# Patient Record
Sex: MALE | Race: WHITE | NOT HISPANIC OR LATINO | Employment: STUDENT | URBAN - METROPOLITAN AREA
[De-identification: names, ages, dates, MRNs, and addresses within clinical notes are randomized per-mention and may not be internally consistent; named-entity substitution may affect disease eponyms.]

---

## 2017-06-06 ENCOUNTER — ALLSCRIPTS OFFICE VISIT (OUTPATIENT)
Dept: OTHER | Facility: OTHER | Age: 37
End: 2017-06-06

## 2017-06-09 ENCOUNTER — ALLSCRIPTS OFFICE VISIT (OUTPATIENT)
Dept: OTHER | Facility: OTHER | Age: 37
End: 2017-06-09

## 2017-11-22 ENCOUNTER — ALLSCRIPTS OFFICE VISIT (OUTPATIENT)
Dept: OTHER | Facility: OTHER | Age: 37
End: 2017-11-22

## 2017-12-20 NOTE — PROGRESS NOTES
Assessment  1  Acute upper respiratory infection (465 9) (J06 9)    Discussion/Summary    ENCORAGED FLUIDS LIGHT FOODS RECHECK IF PERSISTANT SYMPTOMS  Chief Complaint  1  Cold Symptoms  PT COMPLAINS OF NAUSEA, CHILLS AND SWEATS X5D      History of Present Illness  HPI: SWEATS, CHILLS 4 DAYS WORSE PAST 2 DAYS NO MEDS DID GET FLU SHOT 6 DAYS AGO      Review of Systems   Constitutional: feeling poorly,-- chills-- and-- feeling tired  ENT: no complaints of earache, no loss of hearing, no nosebleeds or nasal discharge, no sore throat or hoarseness  Cardiovascular: no complaints of slow or fast heart rate, no chest pain, no palpitations, no leg claudication or lower extremity edema  Respiratory: no complaints of shortness of breath, no wheezing or cough, no dyspnea on exertion, no orthopnea or PND  Gastrointestinal: no complaints of abdominal pain, no constipation, no nausea or vomiting, no diarrhea or bloody stools  Genitourinary: no complaints of dysuria or incontinence, no hesitancy, no nocturia, no genital lesion, no inadequacy of penile erection  Integumentary: no complaints of skin rash or lesion, no itching or dry skin, no skin wounds  Neurological: headache  Active Problems  1  Acute bacterial conjunctivitis of right eye (372 03) (H10 31)   2  Acute conjunctivitis (372 00) (H10 30)   3  Acute otitis media, bilateral (382 9) (H66 93)   4  Acute pharyngitis (462) (J02 9)   5  Acute upper respiratory infection (465 9) (J06 9)   6  Chronic low back pain (724 2,338 29) (M54 5,G89 29)   7  Contact dermatitis (692 9) (L25 9)   8  Herniated lumbar intervertebral disc (722 10) (M51 26)   9  Herpes simplex type 1 infection (054 9) (B00 9)   10  Lumbago (724 2) (M54 5)   11  Lumbar radiculopathy (724 4) (M54 16)   12  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) (M53 3)   13  Sciatica (724 3) (M54 30)    Past Medical History  1  History of Anxiety and depression (300 00,311) (F41 8)   2   History of Environmental allergies (V15 09) (Z91 09)   3  History of acute sinusitis (V12 69) (Z87 09)   4  History of arthritis (V13 4) (Z87 39)   5  History of cocaine abuse (305 63) (Z87 898)   6  History of drug abuse (305 93) (Z87 898)   7  History of Methamphetamine use (305 70) (F15 10)  Active Problems And Past Medical History Reviewed: The active problems and past medical history were reviewed and updated today  Family History  Unknown    1  Family history of malignant neoplasm (V16 9) (Z80 9)  Family History    2  Denied: Family history of alcoholism    Social History   · Always uses seat belt   · Caffeine use (V49 89) (F15 90)   · Cocaine   · Current Every Day Smoker (305 1)   · Current smoker (305 1) (F17 200)   ·    · Does not exercise (V69 0) (Z72 3)   · Drug use (305 90) (F19 90)   · Has 3 children   · Lives in private house   · Lives with children   · Methamphetamines   · Primary language is English   · Supportive and safe   · Unemployed (V62 0) (Z56 0)  The social history was reviewed and updated today  The social history was reviewed and is unchanged  Surgical History  1  Denied: History of Reported Prior Surgical / Procedural History    Current Meds   1  Ciprofloxacin HCl - 0 3 % Ophthalmic Solution; INSTILL 1 DROP Every 4 hours in affected eye for 5 days; Therapy: [id number] to (Evaluate:19Jun2017)  Requested for: [id number]; Last Rx:09Jun2017 Ordered   2  Gabapentin 600 MG Oral Tablet; Therapy: (Recorded:22Nov2017) to Recorded    The medication list was reviewed and updated today  Allergies  1  Amoxil   2  CeleBREX CAPS   3  Penicillins    Vitals   Recorded: 22Nov2017 11:54AM   Temperature 96 8 F   Heart Rate 620   Systolic 443   Diastolic 90   Height 6 ft    Weight 160 lb    BMI Calculated 21 7   BSA Calculated 1 94     Physical Exam   Constitutional  General appearance: No acute distress, well appearing and well nourished     Ears, Nose, Mouth, and Throat  Otoscopic examination: Tympanic membrance translucent with normal light reflex  Canals patent without erythema  Oropharynx: Normal with no erythema, edema, exudate or lesions  Pulmonary  Auscultation of lungs: Clear to auscultation, equal breath sounds bilaterally, no wheezes, no rales, no rhonci  Cardiovascular  Auscultation of heart: Normal rate and rhythm, normal S1 and S2, without murmurs  Examination of extremities for edema and/or varicosities: Normal    Abdomen  Abdomen: Non-tender, no masses  Lymphatic  Palpation of lymph nodes in neck: No lymphadenopathy  Skin  Skin and subcutaneous tissue: Normal without rashes or lesions           Signatures   Electronically signed by : Luci Jeter DO; Dec 19 2017  8:53PM EST                       (Acknowledgement)

## 2018-01-12 VITALS
HEIGHT: 72 IN | SYSTOLIC BLOOD PRESSURE: 130 MMHG | WEIGHT: 160 LBS | HEART RATE: 116 BPM | DIASTOLIC BLOOD PRESSURE: 90 MMHG | TEMPERATURE: 96.8 F | BODY MASS INDEX: 21.67 KG/M2

## 2018-01-13 VITALS
TEMPERATURE: 97.9 F | BODY MASS INDEX: 21.42 KG/M2 | HEIGHT: 72 IN | OXYGEN SATURATION: 98 % | HEART RATE: 118 BPM | DIASTOLIC BLOOD PRESSURE: 80 MMHG | WEIGHT: 158.13 LBS | SYSTOLIC BLOOD PRESSURE: 112 MMHG | RESPIRATION RATE: 18 BRPM

## 2018-01-13 VITALS
SYSTOLIC BLOOD PRESSURE: 120 MMHG | BODY MASS INDEX: 21.43 KG/M2 | HEIGHT: 72 IN | RESPIRATION RATE: 18 BRPM | HEART RATE: 110 BPM | WEIGHT: 158.25 LBS | DIASTOLIC BLOOD PRESSURE: 70 MMHG

## 2018-01-23 NOTE — MISCELLANEOUS
Message  Return to work or school:   Naveen Still is under my professional care   He was seen in my office on 11/22/2017   He is able to return to work on  11/22/2017            Signatures   Electronically signed by : Mike Gillette DO; Dec  5 2017  1:30PM EST                       (Author)

## 2018-05-15 ENCOUNTER — OFFICE VISIT (OUTPATIENT)
Dept: FAMILY MEDICINE CLINIC | Facility: CLINIC | Age: 38
End: 2018-05-15
Payer: COMMERCIAL

## 2018-05-15 ENCOUNTER — TELEPHONE (OUTPATIENT)
Dept: FAMILY MEDICINE CLINIC | Facility: CLINIC | Age: 38
End: 2018-05-15

## 2018-05-15 VITALS
DIASTOLIC BLOOD PRESSURE: 82 MMHG | TEMPERATURE: 98.6 F | RESPIRATION RATE: 18 BRPM | BODY MASS INDEX: 21.62 KG/M2 | WEIGHT: 163.13 LBS | HEART RATE: 96 BPM | HEIGHT: 73 IN | SYSTOLIC BLOOD PRESSURE: 118 MMHG

## 2018-05-15 DIAGNOSIS — J18.9 WALKING PNEUMONIA: Primary | ICD-10-CM

## 2018-05-15 DIAGNOSIS — J31.0 RHINITIS, UNSPECIFIED TYPE: ICD-10-CM

## 2018-05-15 PROCEDURE — 3725F SCREEN DEPRESSION PERFORMED: CPT | Performed by: NURSE PRACTITIONER

## 2018-05-15 PROCEDURE — 99213 OFFICE O/P EST LOW 20 MIN: CPT | Performed by: NURSE PRACTITIONER

## 2018-05-15 PROCEDURE — 3008F BODY MASS INDEX DOCD: CPT | Performed by: NURSE PRACTITIONER

## 2018-05-15 RX ORDER — DULOXETIN HYDROCHLORIDE 30 MG/1
1 CAPSULE, DELAYED RELEASE ORAL DAILY
COMMUNITY
Start: 2017-11-08

## 2018-05-15 RX ORDER — CLARITHROMYCIN 500 MG/1
500 TABLET, COATED ORAL EVERY 12 HOURS SCHEDULED
Qty: 14 TABLET | Refills: 0 | Status: SHIPPED | OUTPATIENT
Start: 2018-05-15 | End: 2018-05-22

## 2018-05-15 RX ORDER — GABAPENTIN 600 MG/1
TABLET ORAL
COMMUNITY

## 2018-05-15 RX ORDER — LEVOFLOXACIN 500 MG/1
500 TABLET, FILM COATED ORAL EVERY 24 HOURS
Qty: 7 TABLET | Refills: 0 | Status: SHIPPED | OUTPATIENT
Start: 2018-05-15 | End: 2018-05-15 | Stop reason: ALTCHOICE

## 2018-05-15 NOTE — LETTER
May 15, 2018     Patient: Ifrah Leija   YOB: 1980   Date of Visit: 5/15/2018       To Whom it May Concern:    Ifrah Leija is under my professional care  He was seen in my office on 5/15/2018  He may return to work on 5/17/2018  If you have any questions or concerns, please don't hesitate to call           Sincerely,          ALONSO Zhao        CC: No Recipients

## 2018-05-15 NOTE — TELEPHONE ENCOUNTER
Pt pharmacy called in and asked if you can send In a different rx since  levofloxacin  500 mg tablet is not covered by pt insurance please advise

## 2018-05-15 NOTE — PROGRESS NOTES
Assessment/Plan:       Diagnoses and all orders for this visit:    Walking pneumonia  -     levofloxacin (LEVAQUIN) 500 mg tablet; Take 1 tablet (500 mg total) by mouth every 24 hours for 7 days    Rhinitis, unspecified type  Comments:  continue with claritin daily  Delsym for cough           Subjective:      Patient ID: Laurie Powers is a 45 y o  male  Cough   This is a new problem  The current episode started in the past 7 days (2 days)  The problem has been gradually worsening  The problem occurs constantly  The cough is productive of sputum  Associated symptoms include chest pain, ear pain, headaches, nasal congestion, postnasal drip, rhinorrhea, a sore throat, sweats and wheezing  Pertinent negatives include no chills, fever or shortness of breath  Nothing aggravates the symptoms  He has tried OTC cough suppressant (antihistamine ) for the symptoms  The treatment provided no relief  The following portions of the patient's history were reviewed and updated as appropriate: allergies, current medications, past family history, past medical history, past social history, past surgical history and problem list     Review of Systems   Constitutional: Negative for chills and fever  HENT: Positive for ear pain, postnasal drip, rhinorrhea and sore throat  Respiratory: Positive for cough and wheezing  Negative for shortness of breath  Cardiovascular: Positive for chest pain  Gastrointestinal: Negative for abdominal pain, diarrhea, nausea and vomiting  Genitourinary: Negative  Musculoskeletal: Negative  Neurological: Positive for headaches  Objective:      /82 (BP Location: Right arm, Patient Position: Sitting, Cuff Size: Standard)   Pulse 96   Temp 98 6 °F (37 °C) (Temporal)   Resp 18   Ht 6' 1" (1 854 m)   Wt 74 kg (163 lb 2 oz)   BMI 21 52 kg/m²          Physical Exam   Constitutional: He appears well-developed  He has a sickly appearance  He appears ill     HENT:   Head: Normocephalic and atraumatic  Right Ear: Tympanic membrane normal    Left Ear: Tympanic membrane normal    Nose: Rhinorrhea present  Right sinus exhibits maxillary sinus tenderness  Left sinus exhibits maxillary sinus tenderness  Mouth/Throat: Posterior oropharyngeal erythema present  Lots of PND   Cardiovascular: Normal rate, regular rhythm, S1 normal and S2 normal     Pulmonary/Chest: No respiratory distress  He has rhonchi in the right middle field and the left lower field  persistent hacking cough noted  Lymphadenopathy:     He has cervical adenopathy  Left cervical: Superficial cervical adenopathy present

## 2018-06-18 ENCOUNTER — OFFICE VISIT (OUTPATIENT)
Dept: URGENT CARE | Facility: MEDICAL CENTER | Age: 38
End: 2018-06-18
Payer: COMMERCIAL

## 2018-06-18 VITALS
HEART RATE: 108 BPM | SYSTOLIC BLOOD PRESSURE: 102 MMHG | DIASTOLIC BLOOD PRESSURE: 54 MMHG | BODY MASS INDEX: 22.53 KG/M2 | HEIGHT: 73 IN | WEIGHT: 170 LBS | OXYGEN SATURATION: 99 % | TEMPERATURE: 97.9 F | RESPIRATION RATE: 18 BRPM

## 2018-06-18 DIAGNOSIS — K52.9 NONINFECTIOUS GASTROENTERITIS, UNSPECIFIED TYPE: Primary | ICD-10-CM

## 2018-06-18 PROCEDURE — G0382 LEV 3 HOSP TYPE B ED VISIT: HCPCS | Performed by: NURSE PRACTITIONER

## 2018-06-18 PROCEDURE — 99283 EMERGENCY DEPT VISIT LOW MDM: CPT | Performed by: NURSE PRACTITIONER

## 2018-06-18 NOTE — PATIENT INSTRUCTIONS
May alternate Tylenol and Ibuprofen as needed for discomfort  Encourage fluids and rest    Maintain BRAT diet  Advance diet as tolerated  Gatorade or Pedialyte as supplementation  Avoid dairy products until symptoms resolve  F/U with PCP if symptoms persist/worsen or go to nearest emergency department if any signs of dehydration  Dehydration   WHAT YOU NEED TO KNOW:   Dehydration is a condition that develops when your body does not have enough fluid  You may become dehydrated if you do not drink enough water or lose too much fluid  Fluid loss may also cause loss of electrolytes (minerals), such as sodium  DISCHARGE INSTRUCTIONS:   Return to the emergency department if:   · You have a seizure  · You are confused or cannot think clearly  · You are extremely sleepy, or another person cannot wake you  · You become dizzy or faint when you stand  · You are not able to urinate  · You have trouble breathing  · You have a fast or irregular heartbeat  · Your hands or feet are cold, or your face is pale  Contact your healthcare provider if:   · You have trouble drinking liquids because you are vomiting  · Your symptoms get worse  · You have a fever  · You feel very weak or tired  · You have questions or concerns about your condition or care  Follow up with your healthcare provider as directed:  Write down your questions so you remember to ask them during your visits  Prevent or manage dehydration:   · Drink liquids as directed  Liquids that contain water, sugar, and minerals can help your body hold in fluid and help prevent dehydration  Drink liquids throughout the day, not just when you feel thirsty  Men should drink about 3 liters (13 eight-ounce cups) of liquid each day  Women should drink about 2 liters (9 eight-ounce cups) of liquid each day  Drink even more liquid if you will be outdoors, in the sun for a long time, or exercising  · Stay cool    Limit the time you spend outdoors during the hottest part of the day  Dress in lightweight clothes  · Keep track of how often you urinate  If you urinate less than usual or your urine is darker, drink more liquids  © 2017 [address] Information is for End User's use only and may not be sold, redistributed or otherwise used for commercial purposes  All illustrations and images included in CareNotes® are the copyrighted property of A D [employer] M [employer]  or [address]  The above information is an  only  It is not intended as medical advice for individual conditions or treatments  Talk to your doctor, nurse or pharmacist before following any medical regimen to see if it is safe and effective for you

## 2018-06-18 NOTE — LETTER
June 18, 2018     Patient: Rohit Miller   YOB: 1980   Date of Visit: 6/18/2018       To Whom it May Concern:    Rohit Miller was seen in my clinic on 6/18/2018  He may return to work on Tuesday, June 19, 2018  If you have any questions or concerns, please don't hesitate to call           Sincerely,          ALONSO Kaba        CC: No Recipients

## 2018-06-18 NOTE — PROGRESS NOTES
[facility]        NAME: Chantale Alcocer is a 45 y o  male  : 1980    MRN: [id number]  DATE: 2018  TIME: 2:08 PM    Assessment and Plan   Noninfectious gastroenteritis, unspecified type [K52 9]  1  Noninfectious gastroenteritis, unspecified type           Patient Instructions     May alternate Tylenol and Ibuprofen as needed for discomfort  Encourage fluids and rest    Maintain BRAT diet  Advance diet as tolerated  Gatorade or Pedialyte as supplementation  Avoid dairy products until symptoms resolve  Follow up with PCP in 3-5 days  Proceed to  ER if symptoms worsen  Chief Complaint     Chief Complaint   Patient presents with    Abdominal Pain     began Friday; wa a 9 last pm; a "2" today    Diarrhea     stopped today         History of Present Illness       Patient presents with abdominal cramping, and legs cramping with diarrhea since last evening  Continues with intermittent cramping of lower legs and in abdomen  Last episode of diarrhea was around 0200  No blood in stool  Denies, fever, N/V  Took a dose of tums which did not help  No recent travel  No recent antibiotic use  Review of Systems   Review of Systems   Constitutional: Negative for chills and fever  Respiratory: Negative  Cardiovascular: Negative  Gastrointestinal: Positive for abdominal pain and diarrhea  Negative for blood in stool, nausea and vomiting  Musculoskeletal: Negative for myalgias  Skin: Negative for rash           Current Medications       Current Outpatient Prescriptions:     clonazePAM (KlonoPIN) 2 mg tablet, Take 2 mg by mouth 3 (three) times a day as needed for seizures, Disp: , Rfl:     DULoxetine (CYMBALTA) 30 mg delayed release capsule, Take 1 capsule by mouth daily, Disp: , Rfl:     gabapentin (NEURONTIN) 600 MG tablet, Take by mouth, Disp: , Rfl:     naproxen (NAPROSYN) 500 mg tablet, Take 1 tablet by mouth 2 (two) times a day with meals for 10 days, Disp: 20 tablet, Rfl: 0    Current Allergies     Allergies as of 06/18/2018 - Reviewed 06/18/2018   Allergen Reaction Noted    Celebrex [celecoxib] GI Intolerance 01/12/2016    Citalopram      Amoxicillin Rash 01/12/2016            The following portions of the patient's history were reviewed and updated as appropriate: allergies, current medications, past family history, past medical history, past social history, past surgical history and problem list      No past medical history on file  No past surgical history on file  Family History   Problem Relation Age of Onset    No Known Problems Mother     No Known Problems Father          Medications have been verified  Objective   /54   Pulse (!) 108   Temp 97 9 °F (36 6 °C)   Resp 18   Ht 6' 1" (1 854 m)   Wt 77 1 kg (170 lb)   SpO2 99%   BMI 22 43 kg/m²        Physical Exam     Physical Exam   Constitutional: He is oriented to person, place, and time  Vital signs are normal  He appears well-developed and well-nourished  He is cooperative  Non-toxic appearance  He does not have a sickly appearance  He does not appear ill  No distress  HENT:   Head: Normocephalic and atraumatic  Right Ear: Hearing, tympanic membrane, external ear and ear canal normal    Left Ear: Hearing, tympanic membrane, external ear and ear canal normal    Nose: Nose normal  No mucosal edema, rhinorrhea or sinus tenderness  Right sinus exhibits no maxillary sinus tenderness and no frontal sinus tenderness  Left sinus exhibits no maxillary sinus tenderness and no frontal sinus tenderness  Mouth/Throat: Uvula is midline, oropharynx is clear and moist and mucous membranes are normal  Normal dentition  No oropharyngeal exudate  Eyes: Conjunctivae, EOM and lids are normal  Pupils are equal, round, and reactive to light  Right eye exhibits no discharge  Left eye exhibits no discharge  Neck: Trachea normal and normal range of motion  No thyroid mass and no thyromegaly present  Cardiovascular: Normal rate, regular rhythm, S1 normal, S2 normal, normal heart sounds, intact distal pulses and normal pulses  Exam reveals no gallop and no friction rub  No murmur heard  Pulmonary/Chest: Effort normal and breath sounds normal  No respiratory distress  He has no wheezes  He has no rhonchi  He has no rales  He exhibits no tenderness  Abdominal: Normal appearance  Bowel sounds are increased  There is generalized tenderness  There is no rigidity, no rebound, no guarding, no CVA tenderness, no tenderness at McBurney's point and negative Crow's sign  Musculoskeletal: Normal range of motion  He exhibits no edema  Lymphadenopathy:     He has no cervical adenopathy  Neurological: He is alert and oriented to person, place, and time  Skin: Skin is warm and dry  No rash noted  He is not diaphoretic  Psychiatric: He has a normal mood and affect  His behavior is normal  Thought content normal    Nursing note and vitals reviewed

## 2018-09-06 ENCOUNTER — OFFICE VISIT (OUTPATIENT)
Dept: URGENT CARE | Facility: MEDICAL CENTER | Age: 38
End: 2018-09-06
Payer: COMMERCIAL

## 2018-09-06 VITALS
HEART RATE: 104 BPM | TEMPERATURE: 98 F | SYSTOLIC BLOOD PRESSURE: 130 MMHG | OXYGEN SATURATION: 96 % | HEIGHT: 73 IN | WEIGHT: 148 LBS | DIASTOLIC BLOOD PRESSURE: 68 MMHG | RESPIRATION RATE: 18 BRPM | BODY MASS INDEX: 19.61 KG/M2

## 2018-09-06 DIAGNOSIS — L01.00 IMPETIGO: Primary | ICD-10-CM

## 2018-09-06 PROCEDURE — 99283 EMERGENCY DEPT VISIT LOW MDM: CPT | Performed by: FAMILY MEDICINE

## 2018-09-06 PROCEDURE — G0382 LEV 3 HOSP TYPE B ED VISIT: HCPCS | Performed by: FAMILY MEDICINE

## 2018-09-06 RX ORDER — MUPIROCIN CALCIUM 20 MG/G
CREAM TOPICAL 3 TIMES DAILY
Qty: 15 G | Refills: 0 | Status: SHIPPED | OUTPATIENT
Start: 2018-09-06

## 2018-09-06 NOTE — PROGRESS NOTES
[facility]        NAME: Marta Gan is a 45 y o  male  : 1980    MRN: [id number]  DATE: 2018  TIME: 5:50 PM    Assessment and Plan   Impetigo [L01 00]  1  Impetigo  mupirocin (BACTROBAN) 2 % cream         Patient Instructions       Follow up with PCP in 3-5 days  Proceed to  ER if symptoms worsen  Chief Complaint     Chief Complaint   Patient presents with    Blister     Patient relates started with blisters to his face x1 month  He missed work yesterday and today needs a note for work  Denies fever  History of Present Illness       Patient describes having blisters on his face on and off for the past month  The seem to recur  In the past week however, sore 7 tender specially around the corner of his left upper lip  At times it drains  He has been applying Neosporin ointment with no significant improvement  He has also developed some sores around his nostrils approximately same duration  He denies fever or chills  Rash is localized to face only  No previous history in the past   At times sores around the nose seem to get crusty  Expresses concern for MRSA  He also needs a work excuse  He has recently noticed at times he gets some nasal congestion and postnasal drip but denies previous history of seasonal allergies  Review of Systems   Review of Systems   Constitutional: Negative  HENT: Positive for congestion  Eyes: Negative  Respiratory: Negative  Neurological: Negative            Current Medications       Current Outpatient Prescriptions:     clonazePAM (KlonoPIN) 2 mg tablet, Take 2 mg by mouth 3 (three) times a day as needed for seizures, Disp: , Rfl:     DULoxetine (CYMBALTA) 30 mg delayed release capsule, Take 1 capsule by mouth daily, Disp: , Rfl:     gabapentin (NEURONTIN) 600 MG tablet, Take by mouth, Disp: , Rfl:     mupirocin (BACTROBAN) 2 % cream, Apply topically 3 (three) times a day, Disp: 15 g, Rfl: 0    Current Allergies Allergies as of 09/06/2018 - Reviewed 09/06/2018   Allergen Reaction Noted    Celebrex [celecoxib] GI Intolerance 01/12/2016    Amoxicillin Rash 01/12/2016            The following portions of the patient's history were reviewed and updated as appropriate: allergies, current medications, past family history, past medical history, past social history, past surgical history and problem list      History reviewed  No pertinent past medical history  History reviewed  No pertinent surgical history  Family History   Problem Relation Age of Onset    No Known Problems Mother     No Known Problems Father          Medications have been verified  Objective   /68   Pulse 104   Temp 98 °F (36 7 °C) (Tympanic)   Resp 18   Ht 6' 1" (1 854 m)   Wt 67 1 kg (148 lb)   SpO2 96%   BMI 19 53 kg/m²        Physical Exam     Physical Exam   Constitutional: He appears well-developed and well-nourished  HENT:   Right and left nares reveals erythematous, scab switch seem hypertension eyes  Neck: Normal range of motion  Neck supple  Pulmonary/Chest: Effort normal and breath sounds normal    Skin:   Multiple, erythematous scabs/excoriation measuring between 2-3 mm  One area of scar a shin located in the corner of his left upper lip which is tender to touch  There is no evidence of induration  Nursing note and vitals reviewed

## 2018-09-06 NOTE — PATIENT INSTRUCTIONS
I prescribed mupirocin cream 2% to be applied t i d  to affected source around the nostril and facial lesions  If there is no improvement 1 week, recommended patient follow-up per primary care provider  Source of face could be related to MRSA  Impetigo   WHAT YOU NEED TO KNOW:   Impetigo is a skin infection caused by bacteria  The infection can cause sores to form anywhere on your body  The sores develop watery or pus-filled blisters that break and form thick crusts  Impetigo is most common in children and spreads easily from person to person  DISCHARGE INSTRUCTIONS:   Return to the emergency department if:   · You have painful, red, warm skin around the blisters  · Your face is swollen  · You urinate less than usual or there is blood in your urine  Contact your healthcare provider if:   · You have a fever  · The sores become more red, swollen, warm, or tender  · The sores do not start to heal after 3 days of treatment  · You have questions or concerns about your condition or care  Medicines:   · Antibiotics  treat the bacterial infection  Antibiotics may be given as a pill or cream  Wash your skin and gently remove any crusts before you apply the antibiotic cream      · Take your medicine as directed  Contact your healthcare provider if you think your medicine is not helping or if you have side effects  Tell him or her if you are allergic to any medicine  Keep a list of the medicines, vitamins, and herbs you take  Include the amounts, and when and why you take them  Bring the list or the pill bottles to follow-up visits  Carry your medicine list with you in case of an emergency  Prevent the spread of impetigo:   · Avoid direct contact  You can spread impetigo if someone touches or uses something that touched your infected skin  You can also spread impetigo on your own body when you touch the area and then touch somewhere else   Keep the sores covered with gauze so you will not scratch or touch them  Keep your fingernails short  Your child may need to wear mittens so he does not scratch his sores  · Wash your hands often  Always wash your hands after you touch the infected area  Wash your hands before you touch food, your eyes, or other people  If no water is available, use an alcohol-based gel to clean your hands  · Wash household items  Do not share or reuse items that have come in contact with impetigo sores  Examples include bedding, towels, washcloths, and eating utensils  These items may be used again after they have been washed with hot water and soap  Clean your sores safely:  Wash your skin sores with antibacterial soap and water  You may need to do this 2 to 3 times each day until the sores heal  If the area is crusted, gently wash the sores with gauze or a clean washcloth to remove the crust  Pat the area dry with a clean towel  Wash your hands, the washcloth, and the towel after you clean the area around the sores  Return to work or school: You may return to work or school 48 hours after you start the antibiotic medicine  If your child has impetigo, tell his school or  center about the infection  Follow up with your healthcare provider as directed:  Write down your questions so you remember to ask them during your visits  © 2017 [address] [employer] Information is for End User's use only and may not be sold, redistributed or otherwise used for commercial purposes  All illustrations and images included in CareNotes® are the copyrighted property of [employer] M [employer] Inc  or [employer]  The above information is an  only  It is not intended as medical advice for individual conditions or treatments  Talk to your doctor, nurse or pharmacist before following any medical regimen to see if it is safe and effective for you

## 2018-09-06 NOTE — LETTER
September 6, 2018     Patient: Richard Prieto   YOB: 1980   Date of Visit: 9/6/2018       To Whom It May Concern: It is my medical opinion that Richard Prieto may return to work on 9/7/2018  If you have any questions or concerns, please don't hesitate to call           Sincerely,        Samuel Greenberg MD    CC: No Recipients

## 2019-06-24 ENCOUNTER — OFFICE VISIT (OUTPATIENT)
Dept: URGENT CARE | Facility: MEDICAL CENTER | Age: 39
End: 2019-06-24
Payer: COMMERCIAL

## 2019-06-24 VITALS
DIASTOLIC BLOOD PRESSURE: 76 MMHG | BODY MASS INDEX: 21.34 KG/M2 | HEART RATE: 94 BPM | HEIGHT: 73 IN | SYSTOLIC BLOOD PRESSURE: 132 MMHG | WEIGHT: 161 LBS | OXYGEN SATURATION: 99 % | TEMPERATURE: 97 F | RESPIRATION RATE: 16 BRPM

## 2019-06-24 DIAGNOSIS — J01.10 ACUTE FRONTAL SINUSITIS, RECURRENCE NOT SPECIFIED: Primary | ICD-10-CM

## 2019-06-24 PROCEDURE — 99213 OFFICE O/P EST LOW 20 MIN: CPT | Performed by: FAMILY MEDICINE

## 2019-06-24 PROCEDURE — 99283 EMERGENCY DEPT VISIT LOW MDM: CPT | Performed by: FAMILY MEDICINE

## 2019-06-24 PROCEDURE — G0382 LEV 3 HOSP TYPE B ED VISIT: HCPCS | Performed by: FAMILY MEDICINE

## 2019-06-24 RX ORDER — AZITHROMYCIN 250 MG/1
TABLET, FILM COATED ORAL
Qty: 6 TABLET | Refills: 0 | Status: SHIPPED | OUTPATIENT
Start: 2019-06-24 | End: 2019-06-28

## 2019-12-11 ENCOUNTER — OFFICE VISIT (OUTPATIENT)
Dept: URGENT CARE | Facility: MEDICAL CENTER | Age: 39
End: 2019-12-11
Payer: COMMERCIAL

## 2019-12-11 VITALS
SYSTOLIC BLOOD PRESSURE: 106 MMHG | DIASTOLIC BLOOD PRESSURE: 78 MMHG | TEMPERATURE: 97.4 F | RESPIRATION RATE: 16 BRPM | BODY MASS INDEX: 21.24 KG/M2 | HEIGHT: 73 IN | OXYGEN SATURATION: 96 % | HEART RATE: 107 BPM

## 2019-12-11 DIAGNOSIS — S39.012A STRAIN OF LUMBAR REGION, INITIAL ENCOUNTER: Primary | ICD-10-CM

## 2019-12-11 DIAGNOSIS — M54.31 SCIATICA OF RIGHT SIDE: ICD-10-CM

## 2019-12-11 PROCEDURE — G0382 LEV 3 HOSP TYPE B ED VISIT: HCPCS | Performed by: PHYSICIAN ASSISTANT

## 2019-12-11 PROCEDURE — 99283 EMERGENCY DEPT VISIT LOW MDM: CPT | Performed by: PHYSICIAN ASSISTANT

## 2019-12-11 PROCEDURE — 99213 OFFICE O/P EST LOW 20 MIN: CPT | Performed by: PHYSICIAN ASSISTANT

## 2019-12-11 RX ORDER — IBUPROFEN 600 MG/1
600 TABLET ORAL EVERY 6 HOURS PRN
Qty: 30 TABLET | Refills: 0 | Status: SHIPPED | OUTPATIENT
Start: 2019-12-11

## 2019-12-11 RX ORDER — METHOCARBAMOL 500 MG/1
500 TABLET, FILM COATED ORAL 4 TIMES DAILY
Qty: 20 TABLET | Refills: 0 | Status: SHIPPED | OUTPATIENT
Start: 2019-12-11

## 2019-12-11 NOTE — PATIENT INSTRUCTIONS
Sciatica   WHAT YOU NEED TO KNOW:   Sciatica is a condition that causes pain along your sciatic nerve  The sciatic nerve runs from your spine through both sides of your buttocks  It then runs down the back of your thigh, into your lower leg and foot  Your sciatic nerve may be compressed, inflamed, irritated, or stretched  DISCHARGE INSTRUCTIONS:   Medicines:   · NSAIDs:  These medicines decrease swelling and pain  NSAIDs are available without a doctor's order  Ask your healthcare provider which medicine is right for you  Ask how much to take and when to take it  Take as directed  NSAIDs can cause stomach bleeding or kidney problems if not taken correctly  · Acetaminophen: This medicine decreases pain  Acetaminophen is available without a doctor's order  Ask how much to take and when to take it  Follow directions  Acetaminophen can cause liver damage if not taken correctly  · Muscle relaxers  help decrease pain and muscle spasms  · Take your medicine as directed  Contact your healthcare provider if you think your medicine is not helping or if you have side effects  Tell him of her if you are allergic to any medicine  Keep a list of the medicines, vitamins, and herbs you take  Include the amounts, and when and why you take them  Bring the list or the pill bottles to follow-up visits  Carry your medicine list with you in case of an emergency  Follow up with your healthcare provider as directed:  Write down your questions so you remember to ask them during your visits  Manage your symptoms:   · Activity:  Decrease your activity  Do not lift heavy objects or twist your back for at least 6 weeks  Slowly return to your usual activity  · Ice:  Ice helps decrease swelling and pain  Ice may also help prevent tissue damage  Use an ice pack, or put crushed ice in a plastic bag  Cover it with a towel and place it on your low back or leg for 15 to 20 minutes every hour or as directed      · Heat:  Heat helps decrease pain and muscle spasms  Apply heat on the area for 20 to 30 minutes every 2 hours for as many days as directed  · Physical therapy:  You may need to see physical therapist to teach you exercises to help improve movement and strength, and to decrease pain  An occupational therapist teaches you skills to help with your daily activities  · Use assistive devices if directed: You may need to wear back support, such as a back brace  You may need crutches, a cane, or a walker to decrease stress on your lower back and leg muscles  Ask your healthcare provider for more information about assistive devices and how to use them correctly  Self-care:   · Avoid pressure on your back and legs:  Do not  lift heavy objects, or stand or sit for long periods of time  · Lift objects safely:  Keep your back straight and bend your knees when you  an object  Do not bend or twist your back when you lift  · Maintain a healthy weight:  Ask your healthcare provider how much you should weigh  Ask him to help you create a weight loss plan if you are overweight  · Exercise:  Ask your healthcare provider about the best stretching, warmup, and exercise plan for you  Contact your healthcare provider if:   · You have pain in your lower back at night or when resting  · You have pain in your lower back with numbness below the knee  · You have weakness in one leg only  · You have questions or concerns about your condition or care  Return to the emergency department if:   · You have trouble holding back your urine or bowel movements  · You have weakness in both legs  · You have numbness in your groin or buttocks  © 2017 [address] Information is for End User's use only and may not be sold, redistributed or otherwise used for commercial purposes  All illustrations and images included in CareNotes® are the copyrighted property of [employer]  or Nima Ferrer    The above information is an  only  It is not intended as medical advice for individual conditions or treatments  Talk to your doctor, nurse or pharmacist before following any medical regimen to see if it is safe and effective for you  Acute Low Back Pain, Ambulatory Care   GENERAL INFORMATION:   Acute low back pain  is discomfort in your lower back area that lasts for less than 12 weeks  The word acute is used to describe pain that starts suddenly, worsens quickly, and lasts for a short time  Common symptoms include the following:   · Back stiffness or spasms    · Pain down the back or side of one leg    · Holding yourself in an unusual position or posture to decrease your back pain    · Not being able to find a sitting position that is comfortable    · Slow increase in your pain for 24 to 48 hours after you stress your back    · Tenderness on your lower back or severe pain when you move your back  Seek immediate care for the following symptoms:   · Severe pain    · Sudden stiffness and heaviness in both buttocks down to both legs    · Numbness or weakness in one leg, or pain in both legs    · Numbness in your genital area or across your lower back    · Unable to control your urine or bowel movements  Treatment for acute low back pain  may include any of the following:  · Medicines:      ¨ NSAIDs  help decrease swelling and pain or fever  This medicine is available with or without a doctor's order  NSAIDs can cause stomach bleeding or kidney problems in certain people  If you take blood thinner medicine, always ask your healthcare provider if NSAIDs are safe for you  Always read the medicine label and follow directions  ¨ Muscle relaxers  help decrease muscle spasms pain  ¨ Prescription pain medicine  may be given  Ask how to take this medicine safely  · Surgery  may be needed if your pain is severe and other treatments do not work   Surgery may be needed for conditions of the lumbar spine, such as herniated disc or spinal stenosis  Manage your symptoms:   · Sleep on a firm mattress  If you do not have a firm mattress, have someone move your mattress to the floor for a few days  A piece of plywood under your mattress can also help make it firmer  · Apply ice  on your lower back for 15 to 20 minutes every hour or as directed  Use an ice pack, or put crushed ice in a plastic bag  Cover it with a towel  Ice helps prevent tissue damage and decreases swelling and pain  You can alternate ice and heat  · Apply heat  on your lower back for 20 to 30 minutes every 2 hours for as many days as directed  Heat helps decrease pain and muscle spasms  · Go to physical therapy  A physical therapist teaches you exercises to help improve movement and strength, and to decrease pain  Prevent acute low back pain:   · Use proper body mechanics  ¨ Bend at the hips and knees when you  objects  Do not bend from the waist  Use your leg muscles as you lift the load  Do not use your back  Keep the object close to your chest as you lift it  Try not to twist or lift anything above your waist     ¨ Change your position often when you stand for long periods of time  Rest one foot on a small box or footrest, and then switch to the other foot often  ¨ Try not to sit for long periods of time  When you do, sit in a straight-backed chair with your feet flat on the floor  Never reach, pull, or push while you are sitting  · Exercise regularly  Warm up before you exercise  Do exercises that strengthen your back muscles  Ask about the best exercise plan for you  · Maintain a healthy weight  Ask your healthcare provider how much you should weigh  Ask him to help you create a weight loss plan if you are overweight  Follow up with your healthcare provider as directed:  Return for a follow-up visit if you still have pain after 1 to 3 weeks of treatment   You may need to visit an orthopedist if your back pain lasts more than 6 to 12 weeks  Write down your questions so you remember to ask them during your visits  CARE AGREEMENT:   You have the right to help plan your care  Learn about your health condition and how it may be treated  Discuss treatment options with your caregivers to decide what care you want to receive  You always have the right to refuse treatment  The above information is an  only  It is not intended as medical advice for individual conditions or treatments  Talk to your doctor, nurse or pharmacist before following any medical regimen to see if it is safe and effective for you  © 2014 [address] is for End User's use only and may not be sold, redistributed or otherwise used for commercial purposes  All illustrations and images included in CareNotes® are the copyrighted property of [employer]  or Nima Ferrer

## 2019-12-11 NOTE — PROGRESS NOTES
[facility]        NAME: Robe Cisse is a 44 y o  male  : 1980    MRN: [id number]  DATE: 2019  TIME: 10:42 AM    /78   Pulse (!) 107   Temp (!) 97 4 °F (36 3 °C) (Tympanic)   Resp 16   Ht 6' 1" (1 854 m)   SpO2 96%   BMI 21 24 kg/m²     Assessment and Plan   No primary diagnosis found  No diagnosis found  Patient Instructions       Follow up with PCP in 3-5 days  Proceed to  ER if symptoms worsen  Chief Complaint     Chief Complaint   Patient presents with    Back Pain     Patient relates started with right lower back pain and right hip pain since Friday  History of Present Illness       Pt with low back pain for several days   Long term back problems     Back Pain   This is a recurrent problem  The current episode started yesterday  The problem occurs constantly  The problem is unchanged  The pain is present in the lumbar spine  The quality of the pain is described as aching, burning and shooting  The pain radiates to the right thigh  The pain is at a severity of 2/10  The pain is mild  The symptoms are aggravated by bending, twisting and coughing  Stiffness is present all day  Pertinent negatives include no abdominal pain, bladder incontinence, bowel incontinence, chest pain, dysuria, fever, headaches, leg pain, numbness, paresis, paresthesias, pelvic pain, perianal numbness, tingling, weakness or weight loss  He has tried nothing for the symptoms  The treatment provided no relief  Review of Systems   Review of Systems   Constitutional: Negative  Negative for fever and weight loss  HENT: Negative  Eyes: Negative  Respiratory: Negative  Cardiovascular: Negative  Negative for chest pain  Gastrointestinal: Negative  Negative for abdominal pain and bowel incontinence  Endocrine: Negative  Genitourinary: Negative  Negative for bladder incontinence, dysuria and pelvic pain  Musculoskeletal: Positive for back pain  Skin: Negative  Allergic/Immunologic: Negative  Neurological: Negative  Negative for tingling, weakness, numbness, headaches and paresthesias  Hematological: Negative  Psychiatric/Behavioral: Negative  All other systems reviewed and are negative  Current Medications       Current Outpatient Medications:     clonazePAM (KlonoPIN) 2 mg tablet, Take 2 mg by mouth 3 (three) times a day as needed for seizures, Disp: , Rfl:     DULoxetine (CYMBALTA) 30 mg delayed release capsule, Take 1 capsule by mouth daily, Disp: , Rfl:     gabapentin (NEURONTIN) 600 MG tablet, Take by mouth, Disp: , Rfl:     mupirocin (BACTROBAN) 2 % cream, Apply topically 3 (three) times a day (Patient not taking: Reported on 12/11/2019), Disp: 15 g, Rfl: 0    Current Allergies     Allergies as of 12/11/2019 - Reviewed 12/11/2019   Allergen Reaction Noted    Celebrex [celecoxib] GI Intolerance 01/12/2016    Amoxicillin Rash 01/12/2016            The following portions of the patient's history were reviewed and updated as appropriate: allergies, current medications, past family history, past medical history, past social history, past surgical history and problem list      History reviewed  No pertinent past medical history  History reviewed  No pertinent surgical history  Family History   Problem Relation Age of Onset    No Known Problems Mother     No Known Problems Father          Medications have been verified  Objective   /78   Pulse (!) 107   Temp (!) 97 4 °F (36 3 °C) (Tympanic)   Resp 16   Ht 6' 1" (1 854 m)   SpO2 96%   BMI 21 24 kg/m²        Physical Exam     Physical Exam   Constitutional: He is oriented to person, place, and time  He appears well-developed and well-nourished  Pt asking for a work note for 1 week  Told pt I can help him out with work note for 2 days    HENT:   Head: Normocephalic and atraumatic     Right Ear: External ear normal    Left Ear: External ear normal    Nose: Nose normal  Mouth/Throat: Oropharynx is clear and moist    Eyes: Pupils are equal, round, and reactive to light  Conjunctivae and EOM are normal    Neck: Normal range of motion  Neck supple  Cardiovascular: Normal rate, regular rhythm and normal heart sounds  Pulmonary/Chest: Effort normal and breath sounds normal    Abdominal: Soft  Bowel sounds are normal    Musculoskeletal: Normal range of motion  Right paraspinal tenderness  No vertebral tenderness   Right sciatic notch pain dtr wnl great toe ext strong bilat right l3l4 paresthesias     Neurological: He is alert and oriented to person, place, and time  Skin: Skin is warm  Capillary refill takes less than 2 seconds  Psychiatric: He has a normal mood and affect  His behavior is normal    Nursing note and vitals reviewed

## 2019-12-11 NOTE — LETTER
December 11, 2019     Patient: Sravani Lu   YOB: 1980   Date of Visit: 12/11/2019       To Whom It May Concern: It is my medical opinion that Sravani Lu may return to work on 12/13/19  If you have any questions or concerns, please don't hesitate to call           Sincerely,        Moreno Stoner PA-C    CC: No Recipients

## 2021-04-22 ENCOUNTER — IMMUNIZATIONS (OUTPATIENT)
Dept: FAMILY MEDICINE CLINIC | Facility: HOSPITAL | Age: 41
End: 2021-04-22

## 2021-04-22 DIAGNOSIS — Z23 ENCOUNTER FOR IMMUNIZATION: Primary | ICD-10-CM

## 2021-04-22 PROCEDURE — 91301 SARS-COV-2 / COVID-19 MRNA VACCINE (MODERNA) 100 MCG: CPT

## 2021-04-22 PROCEDURE — 0011A SARS-COV-2 / COVID-19 MRNA VACCINE (MODERNA) 100 MCG: CPT

## 2021-04-29 ENCOUNTER — IMMUNIZATIONS (OUTPATIENT)
Dept: FAMILY MEDICINE CLINIC | Facility: HOSPITAL | Age: 41
End: 2021-04-29

## 2021-04-29 DIAGNOSIS — Z23 ENCOUNTER FOR IMMUNIZATION: Primary | ICD-10-CM

## 2021-04-29 PROCEDURE — 91301 SARS-COV-2 / COVID-19 MRNA VACCINE (MODERNA) 100 MCG: CPT

## 2021-04-29 PROCEDURE — 0012A SARS-COV-2 / COVID-19 MRNA VACCINE (MODERNA) 100 MCG: CPT

## 2022-09-16 ENCOUNTER — OFFICE VISIT (OUTPATIENT)
Dept: URGENT CARE | Facility: MEDICAL CENTER | Age: 42
End: 2022-09-16
Payer: COMMERCIAL

## 2022-09-16 VITALS
HEART RATE: 121 BPM | SYSTOLIC BLOOD PRESSURE: 132 MMHG | OXYGEN SATURATION: 99 % | RESPIRATION RATE: 18 BRPM | TEMPERATURE: 96.8 F | DIASTOLIC BLOOD PRESSURE: 76 MMHG

## 2022-09-16 DIAGNOSIS — K04.7 TOOTH ABSCESS: Primary | ICD-10-CM

## 2022-09-16 PROCEDURE — 99213 OFFICE O/P EST LOW 20 MIN: CPT | Performed by: FAMILY MEDICINE

## 2022-09-16 RX ORDER — CLINDAMYCIN HYDROCHLORIDE 300 MG/1
300 CAPSULE ORAL 4 TIMES DAILY
Qty: 28 CAPSULE | Refills: 0 | Status: SHIPPED | OUTPATIENT
Start: 2022-09-16 | End: 2022-09-23

## 2022-09-17 NOTE — PROGRESS NOTES
[facility]        NAME: Oneil Birch is a 43 y o  male  : 1980    MRN: [id number]  DATE: 2022  TIME: 8:40 PM    Assessment and Plan   Tooth abscess [K04 7]  1  Tooth abscess  clindamycin (CLEOCIN) 300 MG capsule         Patient Instructions       Follow up with PCP in 3-5 days  Proceed to  ER if symptoms worsen  Chief Complaint     Chief Complaint   Patient presents with    Dental Pain     Patient c/o R side facial  pain with swelling that started today          History of Present Illness       26-year-old male here today with right lower jaw pain and swelling since yesterday  Patient has a known history of poor dentition and try calling his dentist today however he cannot get in until next week  Since yesterday his has noticeable swelling and pain in the right lower jaw  Denies fever but has felt clammy  He has been taking over-the-counter ibuprofen with no noticeable improvement  Review of Systems   Review of Systems   HENT: Positive for dental problem            Current Medications       Current Outpatient Medications:     clindamycin (CLEOCIN) 300 MG capsule, Take 1 capsule (300 mg total) by mouth 4 (four) times a day for 7 days, Disp: 28 capsule, Rfl: 0    clonazePAM (KlonoPIN) 2 mg tablet, Take 2 mg by mouth 3 (three) times a day as needed for seizures, Disp: , Rfl:     DULoxetine (CYMBALTA) 30 mg delayed release capsule, Take 1 capsule by mouth daily, Disp: , Rfl:     gabapentin (NEURONTIN) 600 MG tablet, Take by mouth, Disp: , Rfl:     ibuprofen (MOTRIN) 600 mg tablet, Take 1 tablet (600 mg total) by mouth every 6 (six) hours as needed for mild pain, Disp: 30 tablet, Rfl: 0    methocarbamol (ROBAXIN) 500 mg tablet, Take 1 tablet (500 mg total) by mouth 4 (four) times a day, Disp: 20 tablet, Rfl: 0    mupirocin (BACTROBAN) 2 % cream, Apply topically 3 (three) times a day (Patient not taking: Reported on 2019), Disp: 15 g, Rfl: 0    Current Allergies Allergies as of 09/16/2022 - Reviewed 09/16/2022   Allergen Reaction Noted    Celebrex [celecoxib] GI Intolerance 01/12/2016    Amoxicillin Rash 01/12/2016            The following portions of the patient's history were reviewed and updated as appropriate: allergies, current medications, past family history, past medical history, past social history, past surgical history and problem list      No past medical history on file  No past surgical history on file  Family History   Problem Relation Age of Onset    No Known Problems Mother     No Known Problems Father          Medications have been verified  Objective   /76   Pulse (!) 121   Temp (!) 96 8 °F (36 °C)   Resp 18   SpO2 99%   No LMP for male patient  Physical Exam     Physical Exam  Vitals and nursing note reviewed  Constitutional:       Appearance: Normal appearance  HENT:      Mouth/Throat:      Comments: Nose with swelling of the right lower jaw no erythema tenderness to touch  Findings consistent with tooth abscess  Neurological:      Mental Status: He is alert

## 2022-09-17 NOTE — PATIENT INSTRUCTIONS
I prescribed clindamycin capsule 300 mg 4 times a day for 7 days  Advised to continue with time provided for pain  He is to perform a saline oral     Dental Abscess   WHAT YOU NEED TO KNOW:   A dental abscess is a collection of pus in or around a tooth  A dental abscess is caused by bacteria  The bacteria can enter the tooth when the enamel (outer part of the tooth) is damaged by tooth decay  Bacteria can also enter the tooth through a chip in the tooth or a cut in the gum  Food particles that are stuck between the teeth for a long time may also lead to an abscess  DISCHARGE INSTRUCTIONS:   Return to the emergency department if:   You have severe pain in your tooth or jaw  You have trouble breathing because of pain or swelling  Call your doctor if:   Your symptoms get worse, even after treatment  Your mouth is bleeding  You cannot eat or drink because of pain or swelling  Your abscess returns  You have an injury that causes a crack in your tooth  You have questions or concerns about your condition or care  Medicines: You may  need any of the following:  Antibiotics  help treat a bacterial infection  NSAIDs , such as ibuprofen, help decrease swelling, pain, and fever  This medicine is available with or without a doctor's order  NSAIDs can cause stomach bleeding or kidney problems in certain people  If you take blood thinner medicine, always ask your healthcare provider if NSAIDs are safe for you  Always read the medicine label and follow directions  Acetaminophen  decreases pain and fever  It is available without a doctor's order  Ask how much to take and how often to take it  Follow directions  Read the labels of all other medicines you are using to see if they also contain acetaminophen, or ask your doctor or pharmacist  Acetaminophen can cause liver damage if not taken correctly  Do not use more than 4 grams (4,000 milligrams) total of acetaminophen in one day  Prescription pain medicine  may be given  Ask your healthcare provider how to take this medicine safely  Some prescription pain medicines contain acetaminophen  Do not take other medicines that contain acetaminophen without talking to your healthcare provider  Too much acetaminophen may cause liver damage  Prescription pain medicine may cause constipation  Ask your healthcare provider how to prevent or treat constipation  Take your medicine as directed  Contact your healthcare provider if you think your medicine is not helping or if you have side effects  Tell him of her if you are allergic to any medicine  Keep a list of the medicines, vitamins, and herbs you take  Include the amounts, and when and why you take them  Bring the list or the pill bottles to follow-up visits  Carry your medicine list with you in case of an emergency  Self-care:   Rinse your mouth every 2 hours with salt water  This will help keep the area clean  Gently brush your teeth twice a day with a soft tooth brush  This will help keep the area clean  Eat soft foods as directed  Soft foods may cause less pain  Examples include applesauce, yogurt, and cooked pasta  Ask your healthcare provider how long to follow this instruction  Apply a warm compress to your tooth or gum  Use a cotton ball or gauze soaked in warm water  Remove the compress in 10 minutes or when it becomes cool  Repeat 3 times a day  Prevent another abscess:   Brush your teeth at least 2 times a day  with fluoride toothpaste  Use dental floss at least once a day  to clean between your teeth  Rinse your mouth with water or mouthwash  after meals and snacks  Chew sugarless gum  Avoid sugary and starchy food that can stick between your teeth  Limit drinks high in sugar, such as soda or fruit juice  See your dentist every 6 months  for dental cleanings and oral exams      Follow up with your doctor or dentist in 24 hours, or as directed: Your healthcare provider will need to check your teeth and gums  Write down your questions so you remember to ask them during your visits  © Copyright [employer] 2022 Information is for End User's use only and may not be sold, redistributed or otherwise used for commercial purposes  All illustrations and images included in CareNotes® are the copyrighted property of [employer]  or Damaris Purdy   The above information is an  only  It is not intended as medical advice for individual conditions or treatments  Talk to your doctor, nurse or pharmacist before following any medical regimen to see if it is safe and effective for you  rinses as needed

## 2024-04-04 ENCOUNTER — OFFICE VISIT (OUTPATIENT)
Dept: URGENT CARE | Facility: MEDICAL CENTER | Age: 44
End: 2024-04-04
Payer: COMMERCIAL

## 2024-04-04 VITALS
TEMPERATURE: 97.9 F | DIASTOLIC BLOOD PRESSURE: 89 MMHG | RESPIRATION RATE: 18 BRPM | WEIGHT: 152 LBS | BODY MASS INDEX: 20.15 KG/M2 | OXYGEN SATURATION: 100 % | HEIGHT: 73 IN | SYSTOLIC BLOOD PRESSURE: 121 MMHG | HEART RATE: 101 BPM

## 2024-04-04 DIAGNOSIS — L01.00 IMPETIGO, UNSPECIFIED: Primary | ICD-10-CM

## 2024-04-04 DIAGNOSIS — K13.79 MOUTH SORES: ICD-10-CM

## 2024-04-04 PROCEDURE — 99213 OFFICE O/P EST LOW 20 MIN: CPT | Performed by: FAMILY MEDICINE

## 2024-04-04 RX ORDER — LIDOCAINE HYDROCHLORIDE 20 MG/ML
15 SOLUTION OROPHARYNGEAL 4 TIMES DAILY PRN
Qty: 100 ML | Refills: 0 | Status: SHIPPED | OUTPATIENT
Start: 2024-04-04 | End: 2024-04-11

## 2024-04-04 RX ORDER — CEPHALEXIN 500 MG/1
500 CAPSULE ORAL EVERY 12 HOURS SCHEDULED
Qty: 24 CAPSULE | Refills: 0 | Status: SHIPPED | OUTPATIENT
Start: 2024-04-04 | End: 2024-04-16

## 2024-04-04 RX ORDER — LIDOCAINE HYDROCHLORIDE 20 MG/ML
15 SOLUTION OROPHARYNGEAL 4 TIMES DAILY PRN
Status: DISCONTINUED | OUTPATIENT
Start: 2024-04-04 | End: 2024-04-04

## 2024-04-04 RX ORDER — NAPROXEN 500 MG/1
500 TABLET ORAL 2 TIMES DAILY WITH MEALS
COMMUNITY
Start: 2023-09-05 | End: 2024-09-04

## 2024-04-04 NOTE — PATIENT INSTRUCTIONS
Patient is afebrile.  Vital signs are stable.  Patient started empirically on Keflex 500 mg twice a day for 7 days.  I prescribed viscous lidocaine for mouth sores.  Strongly encourage patient to increase fluid intake.  Strongly advised patient to follow-up with primary care provider regarding chronic weakness dizziness.  He expressed understanding.  Work excuse given.    Impetigo   WHAT YOU NEED TO KNOW:   Impetigo is a skin infection caused by bacteria. The infection can cause sores to form anywhere on your body. The sores develop watery or pus-filled blisters that break and form thick crusts. Impetigo is most common in children and spreads easily from person to person.  DISCHARGE INSTRUCTIONS:   Return to the emergency department if:   You have painful, red, warm skin around the blisters.    Your face is swollen.    You urinate less than usual or there is blood in your urine.    Contact your healthcare provider if:   You have a fever.    The sores become more red, swollen, warm, or tender.    The sores do not start to heal after 3 days of treatment.    You have questions or concerns about your condition or care.    Medicines:   Antibiotics  treat the bacterial infection. Antibiotics may be given as a pill or cream. Wash your skin and gently remove any crusts before you apply the antibiotic cream.     Take your medicine as directed.  Contact your healthcare provider if you think your medicine is not helping or if you have side effects. Tell your provider if you are allergic to any medicine. Keep a list of the medicines, vitamins, and herbs you take. Include the amounts, and when and why you take them. Bring the list or the pill bottles to follow-up visits. Carry your medicine list with you in case of an emergency.    Prevent the spread of impetigo:   Avoid direct contact.  You can spread impetigo if someone touches or uses something that touched your infected skin. You can also spread impetigo on your own body  when you touch the area and then touch somewhere else. Keep the sores covered with gauze so you will not scratch or touch them. Keep your fingernails short. Your child may need to wear mittens so he does not scratch his sores.    Wash your hands often.  Always wash your hands after you touch the infected area. Wash your hands before you touch food, your eyes, or other people. If no water is available, use an alcohol-based gel to clean your hands.    Wash household items.  Do not share or reuse items that have come in contact with impetigo sores. Examples include bedding, towels, washcloths, and eating utensils. These items may be used again after they have been washed with hot water and soap.    Clean your sores safely:  Wash your skin sores with antibacterial soap and water. You may need to do this 2 to 3 times each day until the sores heal. If the area is crusted, gently wash the sores with gauze or a clean washcloth to remove the crust. Pat the area dry with a clean towel. Wash your hands, the washcloth, and the towel after you clean the area around the sores.   Return to work or school:  You may return to work or school 48 hours after you start the antibiotic medicine. If your child has impetigo, tell his school or  center about the infection.  Follow up with your doctor as directed:  Write down your questions so you remember to ask them during your visits.  © Copyright [employer] 2023 Information is for End User's use only and may not be sold, redistributed or otherwise used for commercial purposes.  The above information is an  only. It is not intended as medical advice for individual conditions or treatments. Talk to your doctor, nurse or pharmacist before following any medical regimen to see if it is safe and effective for you.

## 2024-04-04 NOTE — PROGRESS NOTES
[facility]        NAME: Narciso Phillips is a 44 y.o. male  : 1980    MRN: [id number]  DATE: 2024  TIME: 4:29 PM    Assessment and Plan   Impetigo, unspecified [L01.00]  1. Impetigo, unspecified  cephalexin (KEFLEX) 500 mg capsule      2. Mouth sores  Lidocaine Viscous HCl (XYLOCAINE) 2 % mucosal solution    DISCONTINUED: Lidocaine Viscous HCl (XYLOCAINE) 2 % mucosal solution 15 mL            Patient Instructions       Follow up with PCP in 3-5 days.  Proceed to  ER if symptoms worsen.    If tests have been performed at [facility], our office will contact you with results if changes need to be made to the care plan discussed with you at the visit.  [facility]    Chief Complaint     Chief Complaint   Patient presents with    Rash     Pt states on Monday he started with ulcers in his mouth, head  and abdomen.  He feels week, dizzy, and dehydrated Pt has difficulty eating or drinking due to ulcers in mouth.           History of Present Illness       44-year-old male here today with complaint of sores in his mouth and skin rash over the last 4 to 5 days.  Patient unaware of fever but complaining of fatigue dizziness sense of feeling dehydrated.  However he has been voiding today with no problem.  Denies any nausea, vomiting, diarrhea.  He describes the rash first appeared on his face and has a later spread to his chest and abdomen.  Patient unsure of any purulent discharge.  He cannot recall the last time he had similar rash.  Patient does admit that he has a history of dental problems but has not seen his dentist in quite a while.  He is currently taking no medication.  Denies smoking or drinking.  Denies any drug use.    Rash  Associated symptoms include fatigue.       Review of Systems   Review of Systems   Constitutional:  Positive for fatigue.   HENT: Negative.     Respiratory: Negative.     Skin:  Positive for rash.   Neurological: Negative.           Current Medications       Current Outpatient Medications:     cephalexin (KEFLEX) 500 mg capsule, Take 1 capsule (500 mg total) by mouth every 12 (twelve) hours for 12 days, Disp: 24 capsule, Rfl: 0    Lidocaine Viscous HCl (XYLOCAINE) 2 % mucosal solution, Swish and spit 15 mL 4 (four) times a day as needed for mouth pain or discomfort for up to 7 days, Disp: 100 mL, Rfl: 0    naproxen (NAPROSYN) 500 mg tablet, 500 mg 2 (two) times a day with meals, Disp: , Rfl:     clonazePAM (KlonoPIN) 2 mg tablet, Take 2 mg by mouth 3 (three) times a day as needed for seizures (Patient not taking: Reported on 4/4/2024), Disp: , Rfl:     DULoxetine (CYMBALTA) 30 mg delayed release capsule, Take 1 capsule by mouth daily (Patient not taking: Reported on 4/4/2024), Disp: , Rfl:     gabapentin (NEURONTIN) 600 MG tablet, Take by mouth (Patient not taking: Reported on 4/4/2024), Disp: , Rfl:     ibuprofen (MOTRIN) 600 mg tablet, Take 1 tablet (600 mg total) by mouth every 6 (six) hours as needed for mild pain (Patient not taking: Reported on 4/4/2024), Disp: 30 tablet, Rfl: 0    methocarbamol (ROBAXIN) 500 mg tablet, Take 1 tablet (500 mg total) by mouth 4 (four) times a day (Patient not taking: Reported on 4/4/2024), Disp: 20 tablet, Rfl: 0    mupirocin (BACTROBAN) 2 % cream, Apply topically 3 (three) times a day (Patient not taking: Reported on 12/11/2019), Disp: 15 g, Rfl: 0  No current facility-administered medications for this visit.    Current Allergies     Allergies as of 04/04/2024 - Reviewed 04/04/2024   Allergen Reaction Noted    Celebrex [celecoxib] GI Intolerance 01/12/2016    Amoxicillin Rash 01/12/2016            The following portions of the patient's history were reviewed and updated as appropriate: allergies, current medications, past family history, past medical history, past social history, past surgical history and problem list.     Past Medical History:   Diagnosis Date    Inguinal hernia        History  "reviewed. No pertinent surgical history.    Family History   Problem Relation Age of Onset    No Known Problems Mother     No Known Problems Father          Medications have been verified.        Objective   /89 (BP Location: Left arm, Patient Position: Sitting)   Pulse 101   Temp 97.9 °F (36.6 °C) (Tympanic)   Resp 18   Ht 6' 1\" (1.854 m)   Wt 68.9 kg (152 lb)   SpO2 100%   BMI 20.05 kg/m²   No LMP for male patient.       Physical Exam     Physical Exam  Vitals and nursing note reviewed.   Constitutional:       Appearance: Normal appearance.   HENT:      Nose:      Comments: Erythematous hypertrophic turbinates     Mouth/Throat:      Comments: Posterior pharynx is clear.  No exudates visualized.  Lower lip reveals 2 ulcerative lesion each measuring about 1 cm.  Not actively bleeding.  Poor dental hygiene.  Missing crowns especially molar.  Tenderness of the left upper posterior molar.  Evidence of gingivitis  Pulmonary:      Effort: Pulmonary effort is normal.      Breath sounds: Normal breath sounds.   Musculoskeletal:      Cervical back: Normal range of motion and neck supple.   Skin:     General: Skin is warm.      Findings: Rash present.      Comments: Numerous erythematous dry scabs from each measuring approximately 1 cm in size.  Lesion extends from the face, cheeks, anterior chest and abdominal area.  Some lesions are impetiginized.  No induration noted.  Findings seem to be consistent with impetigo                   "

## 2024-04-04 NOTE — LETTER
April 4, 2024     Patient: Narciso Phillips   YOB: 1980   Date of Visit: 4/4/2024       To Whom It May Concern:    It is my medical opinion that Narciso Phillips may return to work on 4/5/2024 .    If you have any questions or concerns, please don't hesitate to call.         Sincerely,        Paresh Ceja MD    CC: No Recipients

## 2024-10-03 ENCOUNTER — OFFICE VISIT (OUTPATIENT)
Dept: URGENT CARE | Facility: MEDICAL CENTER | Age: 44
End: 2024-10-03
Payer: COMMERCIAL

## 2024-10-03 VITALS
OXYGEN SATURATION: 97 % | HEART RATE: 64 BPM | WEIGHT: 160.6 LBS | HEIGHT: 73 IN | DIASTOLIC BLOOD PRESSURE: 56 MMHG | BODY MASS INDEX: 21.29 KG/M2 | RESPIRATION RATE: 18 BRPM | SYSTOLIC BLOOD PRESSURE: 128 MMHG | TEMPERATURE: 99.1 F

## 2024-10-03 DIAGNOSIS — R52 GENERALIZED BODY ACHES: Primary | ICD-10-CM

## 2024-10-03 PROCEDURE — 99213 OFFICE O/P EST LOW 20 MIN: CPT | Performed by: NURSE PRACTITIONER

## 2024-10-03 NOTE — PROGRESS NOTES
[facility]        NAME: Narciso Phillips is a 44 y.o. male  : 1980    MRN: [id number]  DATE: October 3, 2024  TIME: 9:51 AM    Assessment and Plan   Generalized body aches [R52]  1. Generalized body aches          Patient in NAD and VSS upon exam. Discussed with patient exam WNL, offered Toradol for HA and body aches, patient declined. Discussed supportive care and return precautions. Patient/Parent agreeable to plan of care and all questions answered. Note for work/school given as needed.      Patient Instructions       Follow up with PCP in 3-5 days.  Proceed to  ER if symptoms worsen.    If tests have been performed at [facility], our office will contact you with results if changes need to be made to the care plan discussed with you at the visit.  [facility]    Chief Complaint     Chief Complaint   Patient presents with    Generalized Body Aches     Pt states he started with bodyaches, fatigue, decreased appetite and migraines for the last week.  Pt also has a nonproductive cough for the last week.           History of Present Illness       Started: 1 week  Positive: body aches, HA, fatigue, decreased appetite, chills, mild cough and congestion, chest tightness  Negative: ST  Denies CP, SOB, trouble breathing  Treatment: sleep, ibuprofen  Denies hx of asthma or COPD  Denies hx of migraines  Pain HA: 4/10 currently, was 8/10 this morning, last dose Ibuprofen was 5-6 hours ago        Review of Systems   Review of Systems   Constitutional:  Positive for appetite change, chills and fatigue. Negative for fever.   Musculoskeletal:  Positive for myalgias.   Neurological:  Positive for headaches.   All other systems reviewed and are negative.        Current Medications       Current Outpatient Medications:     clonazePAM (KlonoPIN) 2 mg tablet, Take 2 mg by mouth 3 (three) times a day as needed for seizures (Patient not taking: Reported on 2024), Disp: , Rfl:     " DULoxetine (CYMBALTA) 30 mg delayed release capsule, Take 1 capsule by mouth daily (Patient not taking: Reported on 4/4/2024), Disp: , Rfl:     gabapentin (NEURONTIN) 600 MG tablet, Take by mouth (Patient not taking: Reported on 4/4/2024), Disp: , Rfl:     ibuprofen (MOTRIN) 600 mg tablet, Take 1 tablet (600 mg total) by mouth every 6 (six) hours as needed for mild pain (Patient not taking: Reported on 4/4/2024), Disp: 30 tablet, Rfl: 0    methocarbamol (ROBAXIN) 500 mg tablet, Take 1 tablet (500 mg total) by mouth 4 (four) times a day (Patient not taking: Reported on 4/4/2024), Disp: 20 tablet, Rfl: 0    mupirocin (BACTROBAN) 2 % cream, Apply topically 3 (three) times a day (Patient not taking: Reported on 12/11/2019), Disp: 15 g, Rfl: 0    Current Allergies     Allergies as of 10/03/2024 - Reviewed 10/03/2024   Allergen Reaction Noted    Celebrex [celecoxib] GI Intolerance 01/12/2016    Amoxicillin Rash 01/12/2016            The following portions of the patient's history were reviewed and updated as appropriate: allergies, current medications, past family history, past medical history, past social history, past surgical history and problem list.     Past Medical History:   Diagnosis Date    Inguinal hernia        History reviewed. No pertinent surgical history.    Family History   Problem Relation Age of Onset    No Known Problems Mother     No Known Problems Father          Medications have been verified.        Objective   /56 (BP Location: Left arm, Patient Position: Sitting)   Pulse 64   Temp 99.1 °F (37.3 °C) (Tympanic)   Resp 18   Ht 6' 1\" (1.854 m)   Wt 72.8 kg (160 lb 9.6 oz)   SpO2 97%   BMI 21.19 kg/m²   No LMP for male patient.       Physical Exam     Physical Exam  Constitutional:       General: He is not in acute distress.     Appearance: Normal appearance. He is not ill-appearing.   HENT:      Head: Normocephalic and atraumatic.      Right Ear: Hearing, tympanic membrane, ear canal and " external ear normal.      Left Ear: Hearing, tympanic membrane, ear canal and external ear normal.      Nose: No congestion.      Right Sinus: Frontal sinus tenderness present. No maxillary sinus tenderness.      Left Sinus: Frontal sinus tenderness present. No maxillary sinus tenderness.      Mouth/Throat:      Lips: Pink.      Mouth: Mucous membranes are moist.      Pharynx: Oropharynx is clear.   Cardiovascular:      Rate and Rhythm: Normal rate and regular rhythm.   Pulmonary:      Effort: Pulmonary effort is normal.      Breath sounds: Normal breath sounds.      Comments: No cough on exam  Musculoskeletal:         General: Normal range of motion.   Lymphadenopathy:      Cervical: No cervical adenopathy.   Skin:     General: Skin is warm and dry.   Neurological:      Mental Status: He is alert and oriented to person, place, and time.

## 2024-10-03 NOTE — PATIENT INSTRUCTIONS
Ibuprofen 400mg every 6-8 hours as needed for pain  Hydrate  Rest as needed  Follow up with PCP if symptoms are not improving

## 2024-10-03 NOTE — LETTER
October 3, 2024     Patient: Narciso Phillips   YOB: 1980   Date of Visit: 10/3/2024       To Whom It May Concern:    It is my medical opinion that Narciso Phillips may return to work on 10/5/2024 .    If you have any questions or concerns, please don't hesitate to call.         Sincerely,        ALONSO Onofre    CC: No Recipients